# Patient Record
Sex: MALE | Race: WHITE
[De-identification: names, ages, dates, MRNs, and addresses within clinical notes are randomized per-mention and may not be internally consistent; named-entity substitution may affect disease eponyms.]

---

## 2021-09-22 ENCOUNTER — HOSPITAL ENCOUNTER (OUTPATIENT)
Dept: HOSPITAL 53 - M RAD | Age: 51
End: 2021-09-22
Attending: INTERNAL MEDICINE
Payer: COMMERCIAL

## 2021-09-22 DIAGNOSIS — N28.1: Primary | ICD-10-CM

## 2021-09-22 NOTE — REP
INDICATION:

SPLENOMEGALY.



COMPARISON:

None.



TECHNIQUE:

Real-time sonographic evaluation of the left upper quadrant attention spleen



FINDINGS:

The spleen measures 10.7 x 3.3 x 10.3 cm.  There is no evidence of splenomegaly or

free fluid in left upper quadrant.  The right kidney was seen to measure 11.1 x 5.1 x

5.3 cm.  In the superior pole region there is a complex elongated 2 x 1 x 1.1 cm sized

mixed but predominantly hypoechoic structure which exhibits some posterior wall

enhancement and increased through transmission.



IMPRESSION:

1. There is no evidence of splenomegaly.

2. Complex appearing cyst superior pole left kidney. Pre and postcontrast enhanced CT

is recommended for complete evaluation.





<Electronically signed by Bautista Winters > 09/22/21 0939